# Patient Record
Sex: FEMALE | HISPANIC OR LATINO | URBAN - METROPOLITAN AREA
[De-identification: names, ages, dates, MRNs, and addresses within clinical notes are randomized per-mention and may not be internally consistent; named-entity substitution may affect disease eponyms.]

---

## 2023-06-01 ENCOUNTER — OFFICE VISIT (OUTPATIENT)
Dept: URGENT CARE | Facility: CLINIC | Age: 48
End: 2023-06-01

## 2023-06-01 VITALS
HEIGHT: 58 IN | OXYGEN SATURATION: 97 % | DIASTOLIC BLOOD PRESSURE: 72 MMHG | RESPIRATION RATE: 16 BRPM | BODY MASS INDEX: 30.86 KG/M2 | SYSTOLIC BLOOD PRESSURE: 110 MMHG | HEART RATE: 88 BPM | TEMPERATURE: 97.8 F | WEIGHT: 147 LBS

## 2023-06-01 DIAGNOSIS — Z02.4 ENCOUNTER FOR DRIVER'S LICENSE HISTORY AND PHYSICAL: Primary | ICD-10-CM

## 2023-06-01 NOTE — PROGRESS NOTES
[facility]        NAME: Stevenson Thayer is a 51 y o  female  : 1975    MRN: [id number]  DATE: 2023  TIME: 3:22 PM    Assessment and Plan   Encounter for 's license history and physical [Z02 4]  1  Encounter for 's license history and physical              Patient Instructions       Follow up with PCP in 3-5 days  Proceed to  ER if symptoms worsen  Chief Complaint     Chief Complaint   Patient presents with   • Annual Exam     Pt here for 's license physical         History of Present Illness       Patient is a 50-year-old female with no significant past medical history presents the office for 's license physical  Patient denies history of seizures, neurological disorders, uncontrolled diabetes, cognitive impairment, circulatory disorders, cardiac disorders, hypertension, or substance abuse  Patient denies any current medical restrictions  Review of Systems   Review of Systems   Constitutional: Negative for appetite change and fever  HENT: Negative for congestion and sore throat  Eyes: Negative for photophobia and visual disturbance  Respiratory: Negative for cough and shortness of breath  Cardiovascular: Negative for chest pain and palpitations  Gastrointestinal: Negative for abdominal pain, diarrhea, nausea and vomiting  Musculoskeletal: Negative for neck pain and neck stiffness  Neurological: Negative for dizziness, seizures, syncope, light-headedness and headaches  Current Medications     No current outpatient medications on file  Current Allergies     Allergies as of 2023   • (No Known Allergies)            The following portions of the patient's history were reviewed and updated as appropriate: allergies, current medications, past family history, past medical history, past social history, past surgical history and problem list      History reviewed  No pertinent past medical history  History reviewed   No pertinent "surgical history  No family history on file  Medications have been verified  Objective   /72   Pulse 88   Temp 97 8 °F (36 6 °C)   Resp 16   Ht 4' 10\" (1 473 m)   Wt 66 7 kg (147 lb)   SpO2 97%   BMI 30 72 kg/m²   No LMP recorded  Patient is postmenopausal        Physical Exam     Physical Exam  Vitals and nursing note reviewed  Constitutional:       Appearance: Normal appearance  She is well-developed  HENT:      Head: Normocephalic and atraumatic  Right Ear: Tympanic membrane, ear canal and external ear normal       Left Ear: Tympanic membrane, ear canal and external ear normal       Nose: Nose normal       Mouth/Throat:      Pharynx: Uvula midline  Eyes:      General: Lids are normal       Conjunctiva/sclera: Conjunctivae normal       Pupils: Pupils are equal, round, and reactive to light  Cardiovascular:      Rate and Rhythm: Normal rate and regular rhythm  Pulses: Normal pulses  Heart sounds: Normal heart sounds  No murmur heard  No friction rub  No gallop  Pulmonary:      Effort: Pulmonary effort is normal       Breath sounds: Normal breath sounds  No wheezing, rhonchi or rales  Abdominal:      General: Bowel sounds are normal       Palpations: Abdomen is soft  Tenderness: There is no abdominal tenderness  Musculoskeletal:         General: Normal range of motion  Cervical back: Full passive range of motion without pain and neck supple  Thoracic back: Normal       Lumbar back: Normal    Lymphadenopathy:      Cervical: No cervical adenopathy  Skin:     General: Skin is warm and dry  Capillary Refill: Capillary refill takes less than 2 seconds  Findings: No rash  Neurological:      General: No focal deficit present  Mental Status: She is alert  Sensory: Sensation is intact  Motor: Motor function is intact  Coordination: Coordination is intact  Gait: Gait is intact        Deep Tendon Reflexes: Reflexes " are normal and symmetric     Psychiatric:         Speech: Speech normal          Behavior: Behavior normal

## 2024-12-12 ENCOUNTER — OFFICE VISIT (OUTPATIENT)
Dept: URGENT CARE | Facility: CLINIC | Age: 49
End: 2024-12-12
Payer: COMMERCIAL

## 2024-12-12 VITALS
WEIGHT: 130 LBS | BODY MASS INDEX: 26.21 KG/M2 | TEMPERATURE: 97 F | HEIGHT: 59 IN | OXYGEN SATURATION: 98 % | DIASTOLIC BLOOD PRESSURE: 84 MMHG | SYSTOLIC BLOOD PRESSURE: 144 MMHG | RESPIRATION RATE: 16 BRPM | HEART RATE: 106 BPM

## 2024-12-12 DIAGNOSIS — M54.41 ACUTE RIGHT-SIDED LOW BACK PAIN WITH RIGHT-SIDED SCIATICA: Primary | ICD-10-CM

## 2024-12-12 PROCEDURE — G0382 LEV 3 HOSP TYPE B ED VISIT: HCPCS

## 2024-12-12 PROCEDURE — S9083 URGENT CARE CENTER GLOBAL: HCPCS

## 2024-12-12 RX ORDER — METHOCARBAMOL 500 MG/1
500 TABLET, FILM COATED ORAL 4 TIMES DAILY
Qty: 28 TABLET | Refills: 0 | Status: SHIPPED | OUTPATIENT
Start: 2024-12-12 | End: 2024-12-19

## 2024-12-12 RX ORDER — PREDNISONE 20 MG/1
40 TABLET ORAL DAILY
Qty: 10 TABLET | Refills: 0 | Status: SHIPPED | OUTPATIENT
Start: 2024-12-12 | End: 2024-12-17

## 2024-12-12 NOTE — PROGRESS NOTES
[facility]        NAME: Jeri Dodson is a 49 y.o. female  : 1975    MRN: [id number]  DATE: 2024  TIME: 6:11 PM    Assessment and Plan   Acute right-sided low back pain with right-sided sciatica [M54.41]  1. Acute right-sided low back pain with right-sided sciatica  predniSONE 20 mg tablet    methocarbamol (ROBAXIN) 500 mg tablet            Patient Instructions     Take Prednisone as prescribed - take in the morning with food start on   Take Robaxin as prescribed     Do not drive or operate heavy machinery while taking Robaxin - this medication can make you drowsy  Rest (for no longer than 24 hours)  Stretching exercises  Alternate ice and heat    Follow up with PCP in 3-5 days.  Proceed to  ER if symptoms worsen.    If tests are performed, our office will contact you with results only if changes need to made to the care plan discussed with you at the visit. [facility]    Chief Complaint     Chief Complaint   Patient presents with    Back Pain     Low back started 1 week ago. Goes into hips. More on right side. 10/10. Never had this pain before.          History of Present Illness       49-year-old female arrives reporting right lower back pain with radiation of pain into bilateral lower extremities.  Patient reports she typically will work 40 hours however due to the holiday season she has been working 60 hours and been having to lift very heavy boxes from the floor.  Patient reports that she thinks the excessive workload is what caused her back pain.  Patient denies any previous injury to back.  Patient denies any numbness or tingling in bilateral lower extremities.  Patient denies any loss of bowel or bladder incontinence.        Review of Systems   Review of Systems   Constitutional: Negative.    HENT: Negative.     Respiratory: Negative.     Cardiovascular: Negative.    Gastrointestinal: Negative.    Genitourinary: Negative.   "  Musculoskeletal:  Positive for back pain.   Skin: Negative.          Current Medications       Current Outpatient Medications:     methocarbamol (ROBAXIN) 500 mg tablet, Take 1 tablet (500 mg total) by mouth 4 (four) times a day for 7 days, Disp: 28 tablet, Rfl: 0    predniSONE 20 mg tablet, Take 2 tablets (40 mg total) by mouth daily for 5 days, Disp: 10 tablet, Rfl: 0    Current Allergies     Allergies as of 12/12/2024    (No Known Allergies)            The following portions of the patient's history were reviewed and updated as appropriate: allergies, current medications, past family history, past medical history, past social history, past surgical history and problem list.     History reviewed. No pertinent past medical history.    History reviewed. No pertinent surgical history.    History reviewed. No pertinent family history.      Medications have been verified.        Objective   /84   Pulse (!) 106   Temp (!) 97 °F (36.1 °C)   Resp 16   Ht 4' 11\" (1.499 m)   Wt 59 kg (130 lb)   SpO2 98%   BMI 26.26 kg/m²        Physical Exam     Physical Exam  Vitals and nursing note reviewed.   Constitutional:       General: She is not in acute distress.     Appearance: Normal appearance. She is not ill-appearing.   HENT:      Head: Normocephalic.      Right Ear: External ear normal.      Left Ear: External ear normal.      Nose: Nose normal.   Eyes:      Pupils: Pupils are equal, round, and reactive to light.   Cardiovascular:      Rate and Rhythm: Normal rate and regular rhythm.      Pulses: Normal pulses.      Heart sounds: Normal heart sounds.   Pulmonary:      Effort: Pulmonary effort is normal. No respiratory distress.      Breath sounds: Normal breath sounds. No stridor. No wheezing, rhonchi or rales.   Chest:      Chest wall: No tenderness.   Musculoskeletal:         General: Tenderness present. No swelling, deformity or signs of injury.      Cervical back: Normal range of motion and neck supple.     "  Lumbar back: Spasms and tenderness present. No swelling, edema, deformity, signs of trauma, lacerations or bony tenderness. Decreased range of motion. Positive right straight leg raise test. Negative left straight leg raise test. No scoliosis.      Right lower leg: No edema.      Left lower leg: No edema.   Lymphadenopathy:      Cervical: No cervical adenopathy.   Skin:     General: Skin is warm and dry.      Capillary Refill: Capillary refill takes less than 2 seconds.   Neurological:      General: No focal deficit present.      Mental Status: She is alert and oriented to person, place, and time.   Psychiatric:         Mood and Affect: Mood normal.         Behavior: Behavior normal.

## 2024-12-12 NOTE — PATIENT INSTRUCTIONS
"  Take Prednisone as prescribed - take in the morning with food start on 12/13  Take Robaxin as prescribed     Do not drive or operate heavy machinery while taking Robaxin - this medication can make you drowsy  Rest (for no longer than 24 hours)  Stretching exercises  Alternate ice and heat    Follow up with PCP in 3-5 days.  Proceed to  ER if symptoms worsen.    If tests are performed, our office will contact you with results only if changes need to made to the care plan discussed with you at the visit. You can review your full results on [facility].    Patient Education     Instrucciones para el ivonne hospitalaria después angel ciática   Acerca de eleanor jean-claude   Usted puede tener dolor, debilidad, entumecimiento y hormigueo que se extienden desde los glúteos hasta la parte posterior de las piernas y los pies. Es lo que se llama \"ciática\". El nervio ciático es un nervio graham que comienza en la parte baja de la espalda. Recorre toda la parte posterior de la pierna. Es posible que un disco o un espolón óseo estén presionando eleanor nervio. Cuando algo está presionando o lastimando eleanor nervio, puede causar ciática. Eleanor es el nombre médico para el dolor, la debilidad, el adormecimiento u hormigueo que desde la nalga, pasa por la pierna y hasta el talón. Puede tener dolor ciático en un lado o en ambos lados. Por lo general, el dolor se aliviará sin necesitad de cirugía.       ¿Qué cuidados se necesitan en casa?   Pregúntele al médico qué debe hacer al llegar a casa. Asegúrese de hacer preguntas si no comprende lo que dice el médico. Así sabrá qué debe hacer.  Manténgase tan activo carmine pueda sin que le cause demasiado dolor. Está christine descansar sobre la espalda un día más o menos. Asegúrese de levantarse y caminar con cuidado narinder el día en la medida que pueda hacerlo. Después de unos días, comience a aumentar lentamente restrepo nivel de actividad en la medida que pueda hacerlo. Si algo hace que restrepo dolor regrese o " empeore, deténgase y vuelva a realizar actividades más sencillas que no le produjeron dolor.  No se siente ni se pare en la misma posición por mucho tiempo. Puede que desee dormir con angel almohada debajo o entre las rodillas si esto montse restrepo dolor.  Puede skyla medicamentos, carmine ibuprofeno o naproxeno, para la hinchazón y el dolor. Estos son medicamentos antiinflamatorios no esteroideos (GEORGES).  ¿Qué cuidados se necesitan en la etapa de seguimiento?   El médico puede pedirle que visite el consultorio para evaluar restrepo progreso. No falte a estas citas.  El médico puede referirlo a fisioterapia o a un quiropráctico para recibir tratamientos para aliviar el dolor y aprender los ejercicios adecuados para usted.  El médico también puede enviarlo a un neurólogo. Eleanor es un médico que se especializa en el tratamiento de problemas nerviosos.  Si no mejora con el tratamiento, es posible que el médico deba enviarlo a un cirujano ortopédico.  ¿Qué medicamentos pueden ser necesarios?   Es posible que el médico le recete medicamentos para lo siguiente:  Aliviar el dolor y la hinchazón  El médico le puede jesica angel inyección de un medicamento antiinflamatorio denominado corticosteroide. [place] aliviará la hinchazón. Hable con restrepo médico acerca de los riesgos de esta inyección.  ¿Estará restringida la actividad física?   Es posible que necesite descansar narinder un tiempo. No debe realizar actividades físicas que empeoren el problema de brian. Hable con restrepo médico si corre, realiza ejercicios o practica deportes. Es posible que no pueda realizar estas actividades hasta que restrepo problema de brian mejore.  ¿Qué problemas podrían surgir?   Dolor de espalda a micah plazo  Pérdida de sensación o movimiento en las piernas o los pies  Aumento de peso, menos fuerza y flexibilidad muscular, huesos más débiles  Necesidad de angel cirugía  Infección  Pérdida de la función intestinal y de la vejiga  ¿Cómo puede prevenirse eleanor problema de brian?    Manténgase activo y realice ejercicios para mantener los músculos marah y flexibles. Wall lentamente y estire antes de hacer ejercicio.  Adopte angel buena postura.  Implemente maneras adecuadas de levantar peso y agacharse:  Separe los pies para que tenga angel buena base de apoyo. Luego, flexione las rodillas cuando recoja algo del suelo.  Al levantar y  un objeto, mantenga recta la espalda. Mantenga el objeto lo más cerca posible del cuerpo. No tuerza la cintura. Más christine, mueva los pies hacia la dirección adonde vaya.  [place] descansos con frecuencia cuando esté sentado narinder períodos prolongados. Levántese y camine alrededor de vez en cuando.  Si pasa mucho tiempo de pie, suba un rato angel pierna en un banco bajo. Luego, cambie de pierna.  Si duerme de lado, póngase angel almohada entre las rodillas para mantener en buena posición la espalda y las piernas.  Use calzado de soporte de buena calidad. Evite usar zapatos de taco alto.  Mantenga un peso eran.  ¿Cuándo arpit llamar al médico?   No puede caminar o empieza a tener problemas para controlar nicolasa intestinos o restrepo vejiga.  Tiene dolor, entumecimiento o debilidad nuevos o que empeoran y se extienden a ambas piernas.  Restrepo dolor empeora, incluso con medicamentos y descanso.  No puede realizar nicolasa actividades normales debido al dolor.  Consejos útiles   El ejercicio acuático o el ciclismo pueden ayudarlo a mantenerse en forma sin empeorar restrepo problema.  Los ejercicios adecuados para la ciática dependerán del problema que causa el dolor. Hable con el médico acerca de qué estiramientos son mejores para usted.  El estiramiento puede ser un poco doloroso, donita nunca debe provocarle juan r agudos. Si le resulta doloroso, relaje hasta sentir solo un estiramiento leve. Todos los ejercicios de estiramiento se deben mantener narinder 20 a 30 segundos para que resulten más útiles. Repita 2 o 3 veces. Repita cada ejercicio de 2 a 3 veces cada día para obtener mejores  resultados.  Recuéstese boca arriba. Flexione la rodilla del lado doloroso hasta que el pie se encuentre a la altura de la otra rodilla. Sin elevar los hombros, lentamente deje caer la rodilla flexionada sobre la otra pierna. Parmjit esto hasta sentir la elongación en los glúteos.  Quede boca arriba con las rodillas dobladas y los pies sobre el piso. Si el problema se encuentra en la pierna derecha, cruce el tobillo derecho sobre el muslo norberto, maria antonia arriba de la rodilla. Estire el brazo derecho entre los muslos y entrelace las solis alrededor del muslo norberto. Poco a poco, lleve el muslo norberto hacia el pecho hasta que sienta el estiramiento en el glúteo derecho.  Repita la enseñanza con nciolasa propias palabras (Te[place] Back): Ayudándolo a comprender   El método de enseñanza recíproca le ayuda a comprender la información que le proporcionamos. Después de hablar con el personal, dígale en nicolasa propias palabras lo que aprendió. [place] ayuda a asegurar que el personal haya descrito cada cosa con claridad. También ayuda a explicar cosas que pueden barbara sido confusas. Antes de irse a casa, asegúrese de poder hacer lo siguiente:  Puedo hablarle sobre mi condición.  Decir qué ayuda a aliviar el dolor.  Decir qué haré en anabela de tener más dolor o entumecimiento en la pierna o el pie.  ¿Dónde puedo obtener más información?   [employer]  http://orthoinfo.aaos.org/topic.cfm?blvbz=l55968   Exención de responsabilidad y uso de la información del consumidor   Esta información general es un resumen limitado de la información sobre el diagnóstico, el tratamiento y/o la medicación. No pretende ser exhaustivo y debe utilizarse carmine angel herramienta para ayudar al usuario a comprender y/o evaluar las posibles opciones de diagnóstico y tratamiento. NO incluye toda la información sobre las enfermedades, los tratamientos, los medicamentos, los efectos secundarios o los riesgos que pueden aplicarse a un  paciente específico. No tiene por objeto ser un consejo médico ni un sustituto del consejo médico. [place] pretende reemplazar al diagnóstico o el tratamiento proporcionados por un proveedor de atención médica con base en el examen y la evaluación por parte de eleanor proveedor de las circunstancias específicas y únicas de un paciente. Los pacientes deben hablar con un proveedor de atención médica para obtener información completa sobre restrepo brian, preguntas médicas y opciones de tratamiento, incluidos los riesgos o beneficios relacionados con el uso de medicamentos. Esta información no respalda ningún tratamiento o medicamento carmnie seguro, eficaz o aprobado para tratar a un paciente específico. [employer] y nicolasa afiliados renuncian a cualquier garantía o responsabilidad relacionada con esta información o con el uso que se tarik de esta. El uso de esta información se rige por las Condiciones de uso, disponibles en https://www.[employer].com/en/know/clinical-effectiveness-terms   Copyright   Copyright © 2024 [employer] y nicolasa licenciantes y/o afiliados. Todos los derechos reservados.

## 2024-12-12 NOTE — LETTER
December 12, 2024     Patient: Jeri Dodson   YOB: 1975   Date of Visit: 12/12/2024       To Whom it May Concern:    Jeri Dodson was seen in my clinic on 12/12/2024. She may return to work on 12/16/2024 .    If you have any questions or concerns, please don't hesitate to call.         Sincerely,          DECLAN Jovel        CC: No Recipients